# Patient Record
Sex: FEMALE | Race: WHITE | Employment: OTHER | URBAN - METROPOLITAN AREA
[De-identification: names, ages, dates, MRNs, and addresses within clinical notes are randomized per-mention and may not be internally consistent; named-entity substitution may affect disease eponyms.]

---

## 2019-11-13 ENCOUNTER — TRANSFERRED RECORDS (OUTPATIENT)
Dept: SURGERY | Facility: CLINIC | Age: 44
End: 2019-11-13

## 2019-11-21 ENCOUNTER — PREP FOR PROCEDURE (OUTPATIENT)
Dept: SURGERY | Facility: CLINIC | Age: 44
End: 2019-11-21

## 2019-11-21 ENCOUNTER — HOSPITAL ENCOUNTER (OUTPATIENT)
Facility: CLINIC | Age: 44
End: 2019-11-21
Attending: SURGERY | Admitting: SURGERY
Payer: COMMERCIAL

## 2019-11-21 ENCOUNTER — OFFICE VISIT (OUTPATIENT)
Dept: SURGERY | Facility: CLINIC | Age: 44
End: 2019-11-21
Payer: COMMERCIAL

## 2019-11-21 ENCOUNTER — TRANSFERRED RECORDS (OUTPATIENT)
Dept: HEALTH INFORMATION MANAGEMENT | Facility: CLINIC | Age: 44
End: 2019-11-21

## 2019-11-21 VITALS
DIASTOLIC BLOOD PRESSURE: 70 MMHG | HEART RATE: 56 BPM | BODY MASS INDEX: 30.53 KG/M2 | HEIGHT: 66 IN | WEIGHT: 190 LBS | SYSTOLIC BLOOD PRESSURE: 114 MMHG

## 2019-11-21 DIAGNOSIS — C73 THYROID CANCER (H): Primary | ICD-10-CM

## 2019-11-21 DIAGNOSIS — C73 THYROID CANCER (H): ICD-10-CM

## 2019-11-21 PROCEDURE — 99244 OFF/OP CNSLTJ NEW/EST MOD 40: CPT | Performed by: SURGERY

## 2019-11-21 ASSESSMENT — MIFFLIN-ST. JEOR: SCORE: 1523.58

## 2019-11-21 NOTE — LETTER
[facility]    [address] [facility], [address]  [place], [place] [address]  Phone [phone]  Fax [phone] [address], [address]  [facility] [place] [address]  Phone [phone]  Fax [phone]    [email or web address]     November 21, 2019    Surgery Consultation, [facility], RADHA Baires MD, MD     Stephie Bee MRN# [id number]   YOB: 1975 Age: 44 year old      PCP:  Jodie Harrell [phone]     Chief Complaint: New diagnosis of thyroid cancer     Pt was seen in consultation from Jodie Harrell.     History of Present Illness:  Stephie Bee is a 44 year old female who presented with a palpable nodule in the anterior neck.  This nodule was noted by the patient's primary care doctor during routine physical exam.  An ultrasound was obtained which revealed a 1.6 cm nodule near the isthmus of the thyroid toward the left lobe.  This had suspicious ultrasound characteristics and a fine-needle aspiration was performed.  This was 97% suggestive of papillary thyroid cancer.  No molecular testing was done.  Patient is clinically euthyroid and takes no thyroid supplementation.  She has no family history of thyroid disease.  She is otherwise quite healthy, takes no medications and has had no previous surgery.  She is here to discuss the relevance of her recent diagnosis.     PMH:  Stephie Bee  has no past medical history on file.  PSH:  Stephie Bee  has no past surgical history on file.     Home medications and allergies reviewed.     Social History:  Stephie Bee  reports that she has never smoked. She has never used smokeless tobacco. She reports current alcohol use. She reports that she does not use drugs.  Family History:  Stephei Bee family history includes Coronary Artery Disease in her maternal grandfather; Diabetes in her maternal grandmother;  "Hyperlipidemia in her father; Prostate Cancer in her paternal grandfather.     ROS:  The 10 point Review of Systems is negative other than noted in the HPI.  No fevers or chills.  No recent weight loss or weight gain.  No swallowing or breathing difficulties.  No voice changes.     Physical Exam:  Blood pressure 114/70, pulse 56, height 1.676 m (5' 6\"), weight 86.2 kg (190 lb).  190 lbs 0 oz  Healthy-appearing young female in no distress.  Patient has a pleasant affect and communicates well.   Pupils equal round and reactive to light.   No cervical lymphadenopathy.  Easily palpated anterior neck nodule just to the left of midline.  Measures approximately 2 cm fairly firm.  Moves appropriately with swallowing.  May be somewhat tethered to the overlying strap muscles.  Skin creases sufficient for thyroid surgery.  Lung fields clear, breathing comfortably.   Heart normal sinus rhythm.  No murmurs rubs or gallops.  Abdomen soft, nontender, nondistended.  Skin warm, dry.  No obvious rashes or lesions.     All new lab and imaging data was reviewed.  This includes personal review of the ultrasound images and pathology reports.      Assessment/plan: Pleasant healthy 44-year-old female with a new diagnosis of well-differentiated papillary thyroid cancer.  This is a relatively small lesion with no suggestion of lymphadenopathy.  I explained to the excellent prognosis which is associated with well differentiated thyroid cancer in young women.  We have ordered a lateral neck ultrasound for lymph node mapping.  If no lateral lymphadenopathy is discovered, I have recommended a total thyroidectomy.  I feel that a lobectomy would be a possibility but would leave her at increased risk for multifocal disease, given the proximity of the nodule to both lobes.  I would also perform a left-sided central neck dissection.  I explained the risks of thyroid surgery, which include bleeding, infection, hypoparathyroidism, and injury to the " recurrent laryngeal nerve.  I also explained my use of the recurrent laryngeal nerve monitor.  The patient and her  had many appropriate questions, all of which were answered to their satisfaction.  Patient was considering getting a second opinion, which I felt was very reasonable.  Surgical co-morbities include seasonal allergies.     Kole Baires M.D.  [facility]  [phone]     Total time spent 60 minutes, 50 minutes of which was spent discussing the prognosis and treatment options for thyroid cancer.

## 2019-11-21 NOTE — PROGRESS NOTES
Surgery Consultation, [facility], RADHA Baires MD, MD    Stephie Bee MRN# [id number]   YOB: 1975 Age: 44 year old     PCP:  Jodie Harrell [phone]    Chief Complaint: New diagnosis of thyroid cancer    Pt was seen in consultation from Jodie Harrell.    History of Present Illness:  Stephie Bee is a 44 year old female who presented with a palpable nodule in the anterior neck.  This nodule was noted by the patient's primary care doctor during routine physical exam.  An ultrasound was obtained which revealed a 1.6 cm nodule near the isthmus of the thyroid toward the left lobe.  This had suspicious ultrasound characteristics and a fine-needle aspiration was performed.  This was 97% suggestive of papillary thyroid cancer.  No molecular testing was done.  Patient is clinically euthyroid and takes no thyroid supplementation.  She has no family history of thyroid disease.  She is otherwise quite healthy, takes no medications and has had no previous surgery.  She is here to discuss the relevance of her recent diagnosis.    PMH:  Stephie Bee  has no past medical history on file.  PSH:  Stephie Bee  has no past surgical history on file.    Home medications and allergies reviewed.    Social History:  Stephie Bee  reports that she has never smoked. She has never used smokeless tobacco. She reports current alcohol use. She reports that she does not use drugs.  Family History:  Stephie Bee family history includes Coronary Artery Disease in her maternal grandfather; Diabetes in her maternal grandmother; Hyperlipidemia in her father; Prostate Cancer in her paternal grandfather.    ROS:  The 10 point Review of Systems is negative other than noted in the HPI.  No fevers or chills.  No recent weight loss or weight gain.  No swallowing or breathing difficulties.  No voice changes.    Physical Exam:  Blood pressure 114/70, pulse 56, height  "1.676 m (5' 6\"), weight 86.2 kg (190 lb).  190 lbs 0 oz  Healthy-appearing young female in no distress.  Patient has a pleasant affect and communicates well.   Pupils equal round and reactive to light.   No cervical lymphadenopathy.  Easily palpated anterior neck nodule just to the left of midline.  Measures approximately 2 cm fairly firm.  Moves appropriately with swallowing.  May be somewhat tethered to the overlying strap muscles.  Skin creases sufficient for thyroid surgery.  Lung fields clear, breathing comfortably.   Heart normal sinus rhythm.  No murmurs rubs or gallops.  Abdomen soft, nontender, nondistended.  Skin warm, dry.  No obvious rashes or lesions.    All new lab and imaging data was reviewed.  This includes personal review of the ultrasound images and pathology reports.     Assessment/plan: Pleasant healthy 44-year-old female with a new diagnosis of well-differentiated papillary thyroid cancer.  This is a relatively small lesion with no suggestion of lymphadenopathy.  I explained to the excellent prognosis which is associated with well differentiated thyroid cancer in young women.  We have ordered a lateral neck ultrasound for lymph node mapping.  If no lateral lymphadenopathy is discovered, I have recommended a total thyroidectomy.  I feel that a lobectomy would be a possibility but would leave her at increased risk for multifocal disease, given the proximity of the nodule to both lobes.  I would also perform a left-sided central neck dissection.  I explained the risks of thyroid surgery, which include bleeding, infection, hypoparathyroidism, and injury to the recurrent laryngeal nerve.  I also explained my use of the recurrent laryngeal nerve monitor.  The patient and her  had many appropriate questions, all of which were answered to their satisfaction.  Patient was considering getting a second opinion, which I felt was very reasonable.  Surgical co-morbities include seasonal " allergies.    Kole Baires M.D.  [facility]  [phone]    Total time spent 60 minutes, 50 minutes of which was spent discussing the prognosis and treatment options for thyroid cancer.    Please route or send letter to:  Primary Care Provider (PCP) and Referring Provider

## 2019-11-25 ENCOUNTER — TELEPHONE (OUTPATIENT)
Dept: SURGERY | Facility: CLINIC | Age: 44
End: 2019-11-25

## 2019-11-25 RX ORDER — CLINDAMYCIN PHOSPHATE 900 MG/50ML
900 INJECTION, SOLUTION INTRAVENOUS SEE ADMIN INSTRUCTIONS
Status: CANCELLED | OUTPATIENT
Start: 2019-11-25

## 2019-11-25 RX ORDER — CLINDAMYCIN PHOSPHATE 900 MG/50ML
900 INJECTION, SOLUTION INTRAVENOUS
Status: CANCELLED | OUTPATIENT
Start: 2019-11-25

## 2019-11-25 NOTE — TELEPHONE ENCOUNTER
Type of surgery: Total thyroidectomy with left central neck dissection  Location of surgery: [facility]  Date and time of surgery: 12/13/19 at 1pm  Surgeon: Dr. Trung Baires  Pre-Op Appt Date: Patient to schedule  Post-Op Appt Date: Patient to schedule   Packet sent out: Yes  Pre-cert/Authorization completed:  Not Applicable  Date: 11/25/19